# Patient Record
Sex: MALE | Race: WHITE
[De-identification: names, ages, dates, MRNs, and addresses within clinical notes are randomized per-mention and may not be internally consistent; named-entity substitution may affect disease eponyms.]

---

## 2018-06-04 ENCOUNTER — RX ONLY (RX ONLY)
Age: 38
End: 2018-06-04

## 2018-06-26 ENCOUNTER — DOCTOR'S OFFICE (OUTPATIENT)
Dept: URBAN - METROPOLITAN AREA CLINIC 136 | Facility: CLINIC | Age: 38
Setting detail: OPHTHALMOLOGY
End: 2018-06-26
Payer: COMMERCIAL

## 2018-06-26 DIAGNOSIS — H52.13: ICD-10-CM

## 2018-06-26 PROCEDURE — 92014 COMPRE OPH EXAM EST PT 1/>: CPT | Performed by: OPTOMETRIST

## 2018-06-26 PROCEDURE — 92015 DETERMINE REFRACTIVE STATE: CPT | Performed by: OPTOMETRIST

## 2018-06-26 ASSESSMENT — CONFRONTATIONAL VISUAL FIELD TEST (CVF)
OS_FINDINGS: FULL
OD_FINDINGS: FULL

## 2018-06-26 ASSESSMENT — REFRACTION_OUTSIDERX
OD_VA2: 20/
OS_VA3: 20/
OD_VA1: 20/20
OS_CYLINDER: -0.50
OS_AXIS: 144
OU_VA: 20/20
OS_SPHERE: -2.50
OD_SPHERE: -2.75
OS_VA2: 20/
OD_AXIS: 30
OD_VA3: 20/
OD_CYLINDER: -0.75
OS_VA1: 20/20

## 2018-06-26 ASSESSMENT — REFRACTION_CURRENTRX
OS_AXIS: 054
OS_VPRISM_DIRECTION: SV
OD_OVR_VA: 20/
OD_AXIS: 119
OD_CYLINDER: +0.75
OD_SPHERE: -3.25
OS_OVR_VA: 20/
OD_VPRISM_DIRECTION: SV
OS_OVR_VA: 20/
OD_OVR_VA: 20/
OS_CYLINDER: +0.50
OS_SPHERE: -3.00
OS_OVR_VA: 20/
OD_OVR_VA: 20/

## 2018-06-26 ASSESSMENT — REFRACTION_MANIFEST
OD_VA2: 20/
OS_VA1: 20/
OS_VA2: 20/
OS_VA1: 20/
OU_VA: 20/
OU_VA: 20/
OD_VA3: 20/
OD_VA2: 20/
OD_VA1: 20/
OD_VA1: 20/
OS_VA2: 20/
OS_VA3: 20/
OS_VA3: 20/
OD_VA3: 20/

## 2018-06-26 ASSESSMENT — VISUAL ACUITY
OS_BCVA: 20/20-1
OD_BCVA: 20/20-2

## 2018-11-13 ENCOUNTER — DOCTOR'S OFFICE (OUTPATIENT)
Dept: URBAN - METROPOLITAN AREA CLINIC 136 | Facility: CLINIC | Age: 38
Setting detail: OPHTHALMOLOGY
End: 2018-11-13
Payer: COMMERCIAL

## 2018-11-13 DIAGNOSIS — H04.123: ICD-10-CM

## 2018-11-13 PROCEDURE — 92014 COMPRE OPH EXAM EST PT 1/>: CPT | Performed by: OPTOMETRIST

## 2018-11-13 ASSESSMENT — REFRACTION_MANIFEST
OS_VA1: 20/
OD_VA2: 20/
OS_SPHERE: -2.50
OS_VA2: 20/
OD_VA3: 20/
OU_VA: 20/20
OS_VA3: 20/
OD_VA1: 20/20
OS_VA2: 20/
OD_VA2: 20/
OS_CYLINDER: -0.50
OU_VA: 20/
OS_AXIS: 144
OD_VA1: 20/
OS_VA1: 20/20
OD_SPHERE: -2.75
OD_VA3: 20/
OS_VA3: 20/
OD_CYLINDER: -0.75
OD_AXIS: 30

## 2018-11-13 ASSESSMENT — SPHEQUIV_DERIVED
OS_SPHEQUIV: -2.75
OD_SPHEQUIV: -3.125

## 2018-11-13 ASSESSMENT — TEAR BREAK UP TIME (TBUT)
OD_TBUT: T
OS_TBUT: T

## 2018-11-13 ASSESSMENT — REFRACTION_CURRENTRX
OS_SPHERE: -3.00
OS_OVR_VA: 20/
OS_AXIS: 054
OS_OVR_VA: 20/
OD_OVR_VA: 20/
OD_SPHERE: -3.25
OS_VPRISM_DIRECTION: SV
OS_OVR_VA: 20/
OD_AXIS: 119
OD_OVR_VA: 20/
OS_CYLINDER: +0.50
OD_OVR_VA: 20/
OD_VPRISM_DIRECTION: SV
OD_CYLINDER: +0.75

## 2018-11-13 ASSESSMENT — VISUAL ACUITY
OD_BCVA: 20/25-1
OS_BCVA: 20/25-2

## 2018-11-13 ASSESSMENT — CONFRONTATIONAL VISUAL FIELD TEST (CVF)
OD_FINDINGS: FULL
OS_FINDINGS: FULL

## 2018-11-13 ASSESSMENT — SUPERFICIAL PUNCTATE KERATITIS (SPK)
OS_SPK: T
OD_SPK: T

## 2018-11-27 ENCOUNTER — DOCTOR'S OFFICE (OUTPATIENT)
Dept: URBAN - METROPOLITAN AREA CLINIC 136 | Facility: CLINIC | Age: 38
Setting detail: OPHTHALMOLOGY
End: 2018-11-27
Payer: COMMERCIAL

## 2018-11-27 DIAGNOSIS — H04.123: ICD-10-CM

## 2018-11-27 DIAGNOSIS — H52.13: ICD-10-CM

## 2018-11-27 PROCEDURE — NO CHARGE N/C PROFESSIONAL COURTESY: Performed by: OPTOMETRIST

## 2018-11-27 ASSESSMENT — REFRACTION_CURRENTRX
OS_AXIS: 142
OS_OVR_VA: 20/
OD_SPHERE: -2.75
OS_SPHERE: -2.50
OD_OVR_VA: 20/
OD_AXIS: 030
OD_VPRISM_DIRECTION: SV
OS_OVR_VA: 20/
OS_CYLINDER: -0.50
OS_OVR_VA: 20/
OS_CYLINDER: -0.50
OD_ADD: +0.00
OS_SPHERE: -2.50
OD_AXIS: 028
OS_AXIS: 140
OD_SPHERE: -2.50
OD_OVR_VA: 20/
OD_OVR_VA: 20/
OS_VPRISM_DIRECTION: SV
OD_CYLINDER: -0.75
OS_ADD: +0.00
OD_CYLINDER: -0.75

## 2018-11-27 ASSESSMENT — REFRACTION_MANIFEST
OS_VA2: 20/
OD_VA3: 20/
OD_VA1: 20/
OS_AXIS: 144
OS_VA1: 20/
OU_VA: 20/
OS_SPHERE: -2.50
OD_VA2: 20/
OD_VA2: 20/
OD_VA1: 20/20
OS_VA2: 20/
OD_VA3: 20/
OS_VA3: 20/
OS_VA1: 20/20
OU_VA: 20/20
OD_CYLINDER: -0.75
OS_CYLINDER: -0.50
OS_VA3: 20/
OD_AXIS: 30
OD_SPHERE: -2.75

## 2018-11-27 ASSESSMENT — VISUAL ACUITY
OD_BCVA: 20/20-
OS_BCVA: 20/25-2

## 2018-11-27 ASSESSMENT — SPHEQUIV_DERIVED
OS_SPHEQUIV: -2.75
OD_SPHEQUIV: -3.125

## 2019-12-12 ENCOUNTER — DOCTOR'S OFFICE (OUTPATIENT)
Dept: URBAN - METROPOLITAN AREA CLINIC 136 | Facility: CLINIC | Age: 39
Setting detail: OPHTHALMOLOGY
End: 2019-12-12
Payer: COMMERCIAL

## 2019-12-12 DIAGNOSIS — H52.13: ICD-10-CM

## 2019-12-12 PROBLEM — H04.123 DRY EYE; BOTH EYES: Status: ACTIVE | Noted: 2018-11-13

## 2019-12-12 PROCEDURE — 92015 DETERMINE REFRACTIVE STATE: CPT | Performed by: OPTOMETRIST

## 2019-12-12 PROCEDURE — 92014 COMPRE OPH EXAM EST PT 1/>: CPT | Performed by: OPTOMETRIST

## 2019-12-12 ASSESSMENT — REFRACTION_MANIFEST
OD_VA1: 20/20
OS_VA3: 20/
OS_VA2: 20/
OS_CYLINDER: -0.50
OD_AXIS: 30
OS_AXIS: 144
OS_VA1: 20/20
OD_VA2: 20/
OD_CYLINDER: -0.75
OD_SPHERE: -2.75
OU_VA: 20/20
OD_VA3: 20/
OS_SPHERE: -2.50

## 2019-12-12 ASSESSMENT — OVER_REFRACTION
OS_VA1: 20/20
OS_CYLINDER: SPH
OD_VA1: 20/20
OS_VA3: 20/
OS_VA2: 20/
OD_SPHERE: PLANO
OS_SPHERE: +0.50
OD_VA2: 20/
OU_VA: 20/20
OD_CYLINDER: SPH
OD_VA3: 20/

## 2019-12-12 ASSESSMENT — REFRACTION_CURRENTRX
OD_CYLINDER: -0.75
OD_OVR_VA: 20/
OD_OVR_VA: 20/
OD_VPRISM_DIRECTION: SV
OS_SPHERE: -2.50
OS_OVR_VA: 20/
OS_OVR_VA: 20/
OS_VPRISM_DIRECTION: SV
OD_CYLINDER: -0.75
OD_SPHERE: -2.75
OS_CYLINDER: -0.50
OD_AXIS: 028
OS_AXIS: 142
OS_AXIS: 140
OS_CYLINDER: -0.50
OS_ADD: +0.00
OD_SPHERE: -2.50
OD_OVR_VA: 20/
OS_OVR_VA: 20/
OS_SPHERE: -2.50
OD_ADD: +0.00
OD_AXIS: 030

## 2019-12-12 ASSESSMENT — CONFRONTATIONAL VISUAL FIELD TEST (CVF)
OS_FINDINGS: FULL
OD_FINDINGS: FULL

## 2019-12-12 ASSESSMENT — TEAR BREAK UP TIME (TBUT)
OD_TBUT: T
OS_TBUT: T

## 2019-12-12 ASSESSMENT — SUPERFICIAL PUNCTATE KERATITIS (SPK)
OS_SPK: T
OD_SPK: T

## 2019-12-12 ASSESSMENT — SPHEQUIV_DERIVED
OS_SPHEQUIV: -2.75
OD_SPHEQUIV: -3.125

## 2019-12-12 ASSESSMENT — VISUAL ACUITY
OD_BCVA: 20/20
OS_BCVA: 20/20

## 2025-01-25 ENCOUNTER — OFFICE VISIT (OUTPATIENT)
Dept: URGENT CARE | Facility: MEDICAL CENTER | Age: 45
End: 2025-01-25
Payer: COMMERCIAL

## 2025-01-25 VITALS
SYSTOLIC BLOOD PRESSURE: 125 MMHG | OXYGEN SATURATION: 100 % | TEMPERATURE: 98.5 F | RESPIRATION RATE: 18 BRPM | DIASTOLIC BLOOD PRESSURE: 76 MMHG | HEART RATE: 67 BPM

## 2025-01-25 DIAGNOSIS — Z48.02 ENCOUNTER FOR REMOVAL OF SUTURES: Primary | ICD-10-CM

## 2025-01-25 PROCEDURE — S9083 URGENT CARE CENTER GLOBAL: HCPCS | Performed by: PHYSICIAN ASSISTANT

## 2025-01-25 PROCEDURE — G0382 LEV 3 HOSP TYPE B ED VISIT: HCPCS | Performed by: PHYSICIAN ASSISTANT

## 2025-01-25 NOTE — PROGRESS NOTES
[facility]        NAME: Philip Riley is a 44 y.o. male  : 1980    MRN: [id number]  DATE: 2025  TIME: 8:21 AM    There were no vitals taken for this visit.    Assessment and Plan   No primary diagnosis found.  No diagnosis found.      Patient Instructions       Follow up with PCP in 3-5 days.  Proceed to  ER if symptoms worsen.    Chief Complaint   No chief complaint on file.        History of Present Illness       Pt for left facial laceration suture removal , laceartion from 1 week ago     Suture / Staple Removal        Review of Systems   Review of Systems   Constitutional: Negative.    HENT: Negative.     Eyes: Negative.    Respiratory: Negative.     Cardiovascular: Negative.    Gastrointestinal: Negative.    Endocrine: Negative.    Genitourinary: Negative.    Musculoskeletal: Negative.    Skin: Negative.    Allergic/Immunologic: Negative.    Neurological: Negative.    Hematological: Negative.    Psychiatric/Behavioral: Negative.     All other systems reviewed and are negative.        Current Medications       Current Outpatient Medications:     aspirin 81 mg chewable tablet, Chew 81 mg daily, Disp: , Rfl:     lisinopril (ZESTRIL) 10 mg tablet, Take 1 tablet (10 mg total) by mouth daily, Disp: 90 tablet, Rfl: 1    rosuvastatin (CRESTOR) 10 MG tablet, Take 1 tablet (10 mg total) by mouth daily, Disp: 90 tablet, Rfl: 1    Current Allergies     Allergies as of 2025 - Reviewed 2024   Allergen Reaction Noted    Penicillins Hives and Rash 2005            The following portions of the patient's history were reviewed and updated as appropriate: allergies, current medications, past family history, past medical history, past social history, past surgical history and problem list.     Past Medical History:   Diagnosis Date    Hypertension        Past Surgical History:   Procedure Laterality Date    VASECTOMY  2023       No family history on file.      Medications have  been verified.        Objective   There were no vitals taken for this visit.       Physical Exam     Physical Exam  Vitals and nursing note reviewed.   Constitutional:       Appearance: Normal appearance. He is normal weight.   HENT:      Head: Normocephalic and atraumatic.   Skin:     Comments: Left facial laceration  + healed no erythema no swelling mild tenderness  sutures easily removed    Neurological:      Mental Status: He is alert.

## 2025-02-05 DIAGNOSIS — I10 ESSENTIAL HYPERTENSION: ICD-10-CM

## 2025-02-05 RX ORDER — LISINOPRIL 10 MG/1
10 TABLET ORAL DAILY
Qty: 90 TABLET | Refills: 1 | Status: SHIPPED | OUTPATIENT
Start: 2025-02-05

## 2025-06-11 DIAGNOSIS — Z82.49 FAMILY HISTORY OF ISCHEMIC HEART DISEASE (IHD): ICD-10-CM

## 2025-06-11 DIAGNOSIS — R93.1 AGATSTON CORONARY ARTERY CALCIUM SCORE BETWEEN 100 AND 400: ICD-10-CM

## 2025-06-11 RX ORDER — ROSUVASTATIN CALCIUM 10 MG/1
10 TABLET, COATED ORAL DAILY
Qty: 90 TABLET | Refills: 1 | Status: SHIPPED | OUTPATIENT
Start: 2025-06-11

## 2025-06-20 ENCOUNTER — OFFICE VISIT (OUTPATIENT)
Age: 45
End: 2025-06-20
Payer: COMMERCIAL

## 2025-06-20 VITALS
HEART RATE: 65 BPM | TEMPERATURE: 97.4 F | HEIGHT: 71 IN | DIASTOLIC BLOOD PRESSURE: 74 MMHG | OXYGEN SATURATION: 99 % | WEIGHT: 154.4 LBS | SYSTOLIC BLOOD PRESSURE: 120 MMHG | BODY MASS INDEX: 21.61 KG/M2

## 2025-06-20 DIAGNOSIS — Z00.00 ROUTINE ADULT HEALTH MAINTENANCE: Primary | ICD-10-CM

## 2025-06-20 PROCEDURE — 99396 PREV VISIT EST AGE 40-64: CPT | Performed by: FAMILY MEDICINE

## 2025-06-24 PROBLEM — Z00.00 ROUTINE ADULT HEALTH MAINTENANCE: Status: ACTIVE | Noted: 2025-06-24

## 2025-06-24 NOTE — PROGRESS NOTES
"Name: Philip Riley      : 1980      MRN: [id number]  Encounter Provider: Haile Arrieta MD  Encounter Date: 2025   Encounter department: [facility]  :  Assessment & Plan  Routine adult health maintenance  Annual well visit. Discussed various safety and health maintenance issues including healthy diet like the Mediterranean diet, exercise, ample sleep, stress reduction, and healthy weight as tolerated. Discussed supportive care and return parameters.               History of Present Illness   Patient is a 43 y/o male who presents for annual well visit admits being active eats and sleeps well.      Review of Systems   Constitutional: Negative.    HENT: Negative.     Eyes: Negative.    Respiratory: Negative.     Cardiovascular: Negative.    Gastrointestinal: Negative.    Endocrine: Negative.    Genitourinary: Negative.    Musculoskeletal: Negative.    Allergic/Immunologic: Negative.    Neurological: Negative.    Hematological: Negative.    Psychiatric/Behavioral: Negative.     All other systems reviewed and are negative.      Objective   /74 (BP Location: Right arm, Patient Position: Sitting, Cuff Size: Standard)   Pulse 65   Temp (!) 97.4 °F (36.3 °C) (Temporal)   Ht 5' 11\" (1.803 m)   Wt 70 kg (154 lb 6.4 oz)   SpO2 99%   BMI 21.53 kg/m²      Physical Exam  Vitals reviewed.   Constitutional:       General: He is not in acute distress.     Appearance: He is well-developed. He is not diaphoretic.   HENT:      Head: Normocephalic and atraumatic.      Right Ear: External ear normal.      Left Ear: External ear normal.      Nose: Nose normal.     Eyes:      General: No scleral icterus.        Right eye: No discharge.         Left eye: No discharge.      Conjunctiva/sclera: Conjunctivae normal.      Pupils: Pupils are equal, round, and reactive to light.     Neck:      Thyroid: No thyromegaly.      Trachea: No tracheal deviation.     Cardiovascular:      Rate and " Rhythm: Normal rate and regular rhythm.      Heart sounds: Normal heart sounds. No murmur heard.     No friction rub.   Pulmonary:      Effort: Pulmonary effort is normal. No respiratory distress.      Breath sounds: Normal breath sounds. No stridor. No wheezing or rales.   Abdominal:      General: There is no distension.      Palpations: Abdomen is soft. There is no mass.      Tenderness: There is no abdominal tenderness. There is no guarding or rebound.     Musculoskeletal:         General: Normal range of motion.      Cervical back: Normal range of motion and neck supple.   Lymphadenopathy:      Cervical: No cervical adenopathy.     Skin:     General: Skin is warm.     Neurological:      Mental Status: He is alert and oriented to person, place, and time.      Cranial Nerves: No cranial nerve deficit.     Psychiatric:         Behavior: Behavior normal.         Thought Content: Thought content normal.         Judgment: Judgment normal.

## 2025-07-24 PROBLEM — Z00.00 ROUTINE ADULT HEALTH MAINTENANCE: Status: RESOLVED | Noted: 2025-06-24 | Resolved: 2025-07-24

## 2025-08-03 DIAGNOSIS — I10 ESSENTIAL HYPERTENSION: ICD-10-CM

## 2025-08-04 RX ORDER — LISINOPRIL 10 MG/1
10 TABLET ORAL DAILY
Qty: 90 TABLET | Refills: 1 | Status: SHIPPED | OUTPATIENT
Start: 2025-08-04